# Patient Record
Sex: FEMALE | Race: ASIAN | Employment: UNEMPLOYED | URBAN - METROPOLITAN AREA
[De-identification: names, ages, dates, MRNs, and addresses within clinical notes are randomized per-mention and may not be internally consistent; named-entity substitution may affect disease eponyms.]

---

## 2017-11-02 PROBLEM — H90.3 SENSORINEURAL HEARING LOSS (SNHL), BILATERAL: Status: ACTIVE | Noted: 2017-11-02

## 2018-04-05 ENCOUNTER — OFFICE VISIT (OUTPATIENT)
Dept: INTERNAL MEDICINE CLINIC | Facility: CLINIC | Age: 48
End: 2018-04-05

## 2018-04-05 VITALS
HEIGHT: 59 IN | OXYGEN SATURATION: 98 % | BODY MASS INDEX: 21.47 KG/M2 | RESPIRATION RATE: 18 BRPM | WEIGHT: 106.5 LBS | TEMPERATURE: 99 F | HEART RATE: 83 BPM | DIASTOLIC BLOOD PRESSURE: 70 MMHG | SYSTOLIC BLOOD PRESSURE: 110 MMHG

## 2018-04-05 DIAGNOSIS — Z00.00 LABORATORY EXAM ORDERED AS PART OF ROUTINE GENERAL MEDICAL EXAMINATION: ICD-10-CM

## 2018-04-05 DIAGNOSIS — Z00.00 WELLNESS EXAMINATION: Primary | ICD-10-CM

## 2018-04-05 DIAGNOSIS — E04.1 THYROID NODULE: ICD-10-CM

## 2018-04-05 DIAGNOSIS — H53.9 CHANGES IN VISION: ICD-10-CM

## 2018-04-05 PROCEDURE — 99386 PREV VISIT NEW AGE 40-64: CPT | Performed by: INTERNAL MEDICINE

## 2018-04-05 NOTE — PROGRESS NOTES
[facility] Internal Medicine Office Note  Chief Complaint:   Patient presents with:  Physical: Est Pt.  Complete physical      HPI:   This is a 51year old female coming in for physical and establishing care  HPI  She would like to do blood work f omega-3 fatty acids 1000 MG Oral Cap Take 1,000 mg by mouth daily.  Disp:  Rfl:    Betamethasone Dipropionate Aug 0.05 % External Cream Apply to external ear bid with q-tip for 2 weeks , repeat as necessary Disp: 1 Tube Rfl: 5   Multiple Vitamins-Minerals vision      The plan is as follows  Israel Quiroz was seen today for physical.    Diagnoses and all orders for this visit:    Wellness examination - up to date with pap. mammo ordered.      Thyroid nodule - due for thyroid US; order placed  -     US THYROID (CPT=76

## 2018-04-06 ENCOUNTER — LAB ENCOUNTER (OUTPATIENT)
Dept: LAB | Age: 48
End: 2018-04-06
Attending: INTERNAL MEDICINE
Payer: COMMERCIAL

## 2018-04-06 DIAGNOSIS — Z00.00 LABORATORY EXAM ORDERED AS PART OF ROUTINE GENERAL MEDICAL EXAMINATION: ICD-10-CM

## 2018-04-06 PROCEDURE — 85025 COMPLETE CBC W/AUTO DIFF WBC: CPT

## 2018-04-06 PROCEDURE — 80061 LIPID PANEL: CPT

## 2018-04-06 PROCEDURE — 80050 GENERAL HEALTH PANEL: CPT

## 2018-04-06 PROCEDURE — 36415 COLL VENOUS BLD VENIPUNCTURE: CPT

## 2018-04-06 PROCEDURE — 84439 ASSAY OF FREE THYROXINE: CPT

## 2018-04-06 PROCEDURE — 80053 COMPREHEN METABOLIC PANEL: CPT

## 2018-04-25 ENCOUNTER — HOSPITAL ENCOUNTER (OUTPATIENT)
Dept: MAMMOGRAPHY | Age: 48
Discharge: HOME | End: 2018-04-25
Attending: OBSTETRICS & GYNECOLOGY
Payer: COMMERCIAL

## 2018-04-25 ENCOUNTER — HOSPITAL ENCOUNTER (OUTPATIENT)
Dept: ULTRASOUND IMAGING | Age: 48
Discharge: HOME | End: 2018-04-25
Attending: INTERNAL MEDICINE
Payer: COMMERCIAL

## 2018-04-25 ENCOUNTER — HOSPITAL ENCOUNTER (OUTPATIENT)
Dept: ULTRASOUND IMAGING | Age: 48
Discharge: HOME | End: 2018-04-25
Attending: OBSTETRICS & GYNECOLOGY
Payer: COMMERCIAL

## 2018-04-25 DIAGNOSIS — E04.1 THYROID NODULE: ICD-10-CM

## 2018-04-25 DIAGNOSIS — D25.1 INTRAMURAL LEIOMYOMA OF UTERUS: ICD-10-CM

## 2018-04-25 DIAGNOSIS — Z12.31 ENCOUNTER FOR SCREENING MAMMOGRAM FOR MALIGNANT NEOPLASM OF BREAST: ICD-10-CM

## 2018-04-25 PROCEDURE — 77067 SCR MAMMO BI INCL CAD: CPT | Performed by: OBSTETRICS & GYNECOLOGY

## 2018-04-25 PROCEDURE — 77063 BREAST TOMOSYNTHESIS BI: CPT | Performed by: OBSTETRICS & GYNECOLOGY

## 2018-04-25 PROCEDURE — 76856 US EXAM PELVIC COMPLETE: CPT | Performed by: OBSTETRICS & GYNECOLOGY

## 2018-04-25 PROCEDURE — 76536 US EXAM OF HEAD AND NECK: CPT | Performed by: INTERNAL MEDICINE

## 2018-04-25 PROCEDURE — 76830 TRANSVAGINAL US NON-OB: CPT | Performed by: OBSTETRICS & GYNECOLOGY

## 2018-04-26 NOTE — PROGRESS NOTES
Patient notified and verbalized understanding. Will follow up in the office with any prolonged heavy bleeding or pain. No complaints currently.

## 2018-04-26 NOTE — PROGRESS NOTES
Please inform pt that fibroid is 4 cm (previously 3.6 cm in 2016). Follow up in office with heavy, prolonged menses or pain.

## 2018-05-03 ENCOUNTER — MED REC SCAN ONLY (OUTPATIENT)
Dept: INTERNAL MEDICINE CLINIC | Facility: CLINIC | Age: 48
End: 2018-05-03

## 2019-04-23 ENCOUNTER — LAB ENCOUNTER (OUTPATIENT)
Dept: LAB | Age: 49
End: 2019-04-23
Attending: INTERNAL MEDICINE
Payer: COMMERCIAL

## 2019-04-23 ENCOUNTER — OFFICE VISIT (OUTPATIENT)
Dept: INTERNAL MEDICINE CLINIC | Facility: CLINIC | Age: 49
End: 2019-04-23
Payer: COMMERCIAL

## 2019-04-23 VITALS
HEIGHT: 59 IN | DIASTOLIC BLOOD PRESSURE: 60 MMHG | RESPIRATION RATE: 16 BRPM | WEIGHT: 106.75 LBS | BODY MASS INDEX: 21.52 KG/M2 | HEART RATE: 62 BPM | SYSTOLIC BLOOD PRESSURE: 118 MMHG | OXYGEN SATURATION: 98 % | TEMPERATURE: 98 F

## 2019-04-23 DIAGNOSIS — Z12.39 SCREENING FOR BREAST CANCER: ICD-10-CM

## 2019-04-23 DIAGNOSIS — E04.9 GOITER: ICD-10-CM

## 2019-04-23 DIAGNOSIS — Z00.00 ENCOUNTER FOR ROUTINE ADULT MEDICAL EXAMINATION: Primary | ICD-10-CM

## 2019-04-23 DIAGNOSIS — Z12.11 SCREEN FOR COLON CANCER: ICD-10-CM

## 2019-04-23 DIAGNOSIS — E04.1 THYROID NODULE: ICD-10-CM

## 2019-04-23 DIAGNOSIS — E55.9 HYPOVITAMINOSIS D: ICD-10-CM

## 2019-04-23 DIAGNOSIS — Z00.00 LABORATORY EXAM ORDERED AS PART OF ROUTINE GENERAL MEDICAL EXAMINATION: ICD-10-CM

## 2019-04-23 DIAGNOSIS — R07.89 NON-CARDIAC CHEST PAIN: ICD-10-CM

## 2019-04-23 DIAGNOSIS — Z12.4 SCREENING FOR CERVICAL CANCER: ICD-10-CM

## 2019-04-23 PROCEDURE — 80061 LIPID PANEL: CPT | Performed by: INTERNAL MEDICINE

## 2019-04-23 PROCEDURE — 82306 VITAMIN D 25 HYDROXY: CPT | Performed by: INTERNAL MEDICINE

## 2019-04-23 PROCEDURE — 99396 PREV VISIT EST AGE 40-64: CPT | Performed by: INTERNAL MEDICINE

## 2019-04-23 PROCEDURE — 87624 HPV HI-RISK TYP POOLED RSLT: CPT | Performed by: INTERNAL MEDICINE

## 2019-04-23 PROCEDURE — 36415 COLL VENOUS BLD VENIPUNCTURE: CPT | Performed by: INTERNAL MEDICINE

## 2019-04-23 PROCEDURE — 80050 GENERAL HEALTH PANEL: CPT | Performed by: INTERNAL MEDICINE

## 2019-04-23 PROCEDURE — 84439 ASSAY OF FREE THYROXINE: CPT | Performed by: INTERNAL MEDICINE

## 2019-04-23 NOTE — PROGRESS NOTES
[facility] Internal Medicine Office Note  Chief Complaint:   Patient presents with:  CPX      HPI:   This is a 47year old female coming in for physical  HPI   Due for blood work  Due for pap smear  Due for colon cancer screening    H/o thyroid n Neurological: Negative. Hematological: Negative. Psychiatric/Behavioral: Negative.          EXAM:   /60   Pulse 62   Temp 98 °F (36.7 °C) (Oral)   Resp 16   Ht 59\"   Wt 106 lb 12 oz   SpO2 98%   BMI 21.56 kg/m²  Estimated body mass index is 2 B    Screen for colon cancer -discussed colonoscopy at length. She is not sure if she will proceed. Gave kit for FIT testing  -     OCCULT BLOOD, FECAL, IMMUNOASSAY;  Future  -     GASTRO - INTERNAL    Screening for breast cancer  -     [facility]ILLA 2D+3D RANULFO today.      Brian Hinojosa MD

## 2019-04-23 NOTE — PATIENT INSTRUCTIONS
Blood work    Trial of Tums to see how you respond with chest discomfort.  Follow up if symptoms persist.      Call to schedule mammogram    Stool FIT testing  Consider colonoscopy in the future

## 2019-04-26 ENCOUNTER — HOSPITAL ENCOUNTER (OUTPATIENT)
Dept: MAMMOGRAPHY | Age: 49
Discharge: HOME | End: 2019-04-26
Attending: INTERNAL MEDICINE
Payer: COMMERCIAL

## 2019-04-26 DIAGNOSIS — Z12.39 SCREENING FOR BREAST CANCER: ICD-10-CM

## 2019-04-26 PROCEDURE — 77067 SCR MAMMO BI INCL CAD: CPT | Performed by: INTERNAL MEDICINE

## 2019-04-26 PROCEDURE — 77063 BREAST TOMOSYNTHESIS BI: CPT | Performed by: INTERNAL MEDICINE

## 2019-05-02 ENCOUNTER — TELEPHONE (OUTPATIENT)
Dept: INTERNAL MEDICINE CLINIC | Facility: CLINIC | Age: 49
End: 2019-05-02

## 2019-05-02 PROCEDURE — 82274 ASSAY TEST FOR BLOOD FECAL: CPT | Performed by: INTERNAL MEDICINE

## 2019-05-02 NOTE — TELEPHONE ENCOUNTER
Pt is concerned about completing the FIT test appropriately d/t not having BM every day. Per SM, as long as consecutive BM's and to [facility] in 10 days. Notified pt and she verbalized understanding.  Also advised pt that the alternative would be

## 2019-07-26 ENCOUNTER — HOSPITAL ENCOUNTER (OUTPATIENT)
Dept: ULTRASOUND IMAGING | Age: 49
Discharge: HOME | End: 2019-07-26
Attending: INTERNAL MEDICINE
Payer: COMMERCIAL

## 2019-07-26 DIAGNOSIS — E04.1 THYROID NODULE: ICD-10-CM

## 2019-07-26 PROCEDURE — 76536 US EXAM OF HEAD AND NECK: CPT | Performed by: INTERNAL MEDICINE

## 2019-08-23 ENCOUNTER — APPOINTMENT (OUTPATIENT)
Dept: LAB | Age: 49
End: 2019-08-23
Attending: OTOLARYNGOLOGY
Payer: COMMERCIAL

## 2019-08-23 DIAGNOSIS — E04.1 NONTOXIC THYROID NODULE: ICD-10-CM

## 2019-08-23 PROCEDURE — 86376 MICROSOMAL ANTIBODY EACH: CPT

## 2019-08-23 PROCEDURE — 86800 THYROGLOBULIN ANTIBODY: CPT

## 2019-08-24 LAB
THYROGLOBULIN AB: <0.9 IU/ML
THYROID PEROXIDASE (TPO) ANTIBODY: 4.7 IU/ML

## 2019-09-05 ENCOUNTER — HOSPITAL ENCOUNTER (OUTPATIENT)
Dept: ULTRASOUND IMAGING | Facility: HOSPITAL | Age: 49
Discharge: HOME | End: 2019-09-05
Attending: OTOLARYNGOLOGY
Payer: COMMERCIAL

## 2019-09-05 DIAGNOSIS — E04.1 NONTOXIC THYROID NODULE: ICD-10-CM

## 2019-09-05 PROCEDURE — 88173 CYTOPATH EVAL FNA REPORT: CPT | Performed by: OTOLARYNGOLOGY

## 2019-09-05 PROCEDURE — 10005 FNA BX W/US GDN 1ST LES: CPT | Performed by: OTOLARYNGOLOGY

## 2019-09-05 NOTE — PROGRESS NOTES
PROCEDURE: US FNA THYROID, GUIDED INCLD, FIRST LESION (CPT=10005)    COMPARISON: US JIMY THYROID (ZHR=[id number]), 7/26/2019, 10:22.     INDICATIONS: E04.1 Nontoxic single thyroid nodule    DESCRIPTION: The risks, benefits, and alternatives of the proced

## 2019-09-17 NOTE — PROGRESS NOTES
I am sorry she has these concerns. I do not make any decisions based on cost. The standard of care is to repeat the biopsies with Affirma and that is what I am recommending. She can seek a second opinion if desired.  She can choose to wait a year but will n

## 2019-09-18 NOTE — PROGRESS NOTES
Per SK: ok to have FNA with Afirma with partner in office. Informed pt, verb understanding. Pt stated she still needs to contact insurance and will call office once she decides how she would like to proceed.

## 2019-10-22 PROBLEM — Z12.11 SPECIAL SCREENING FOR MALIGNANT NEOPLASM OF COLON: Status: ACTIVE | Noted: 2019-10-22

## 2021-09-16 ENCOUNTER — TELEPHONE (OUTPATIENT)
Dept: INTERNAL MEDICINE CLINIC | Facility: CLINIC | Age: 51
End: 2021-09-16

## 2021-09-16 NOTE — TELEPHONE ENCOUNTER
Pt c/o burning sensation/tingling to lips and tongue. Pt reports that she began using a new toothpaste around the same time that her sx started. Denies redness, rash, or sores to the area. No c/o SOB, chest pain, headaches, vision changes.      Patient

## 2023-09-13 ENCOUNTER — OFFICE VISIT (OUTPATIENT)
Dept: INTERNAL MEDICINE CLINIC | Facility: CLINIC | Age: 53
End: 2023-09-13
Payer: COMMERCIAL

## 2023-09-13 ENCOUNTER — LAB ENCOUNTER (OUTPATIENT)
Dept: LAB | Age: 53
End: 2023-09-13
Attending: INTERNAL MEDICINE
Payer: COMMERCIAL

## 2023-09-13 VITALS
TEMPERATURE: 97 F | RESPIRATION RATE: 16 BRPM | OXYGEN SATURATION: 99 % | HEART RATE: 72 BPM | DIASTOLIC BLOOD PRESSURE: 62 MMHG | SYSTOLIC BLOOD PRESSURE: 110 MMHG | HEIGHT: 59 IN | WEIGHT: 117.19 LBS | BODY MASS INDEX: 23.63 KG/M2

## 2023-09-13 DIAGNOSIS — Z00.00 ENCOUNTER FOR ROUTINE ADULT MEDICAL EXAMINATION: Primary | ICD-10-CM

## 2023-09-13 DIAGNOSIS — N64.4 BREAST PAIN, LEFT: ICD-10-CM

## 2023-09-13 DIAGNOSIS — Z23 NEED FOR TDAP VACCINATION: ICD-10-CM

## 2023-09-13 DIAGNOSIS — E55.9 HYPOVITAMINOSIS D: ICD-10-CM

## 2023-09-13 DIAGNOSIS — Z00.00 LABORATORY EXAM ORDERED AS PART OF ROUTINE GENERAL MEDICAL EXAMINATION: ICD-10-CM

## 2023-09-13 DIAGNOSIS — M25.562 CHRONIC PAIN OF LEFT KNEE: ICD-10-CM

## 2023-09-13 DIAGNOSIS — Z12.31 SCREENING MAMMOGRAM FOR BREAST CANCER: ICD-10-CM

## 2023-09-13 DIAGNOSIS — G89.29 CHRONIC PAIN OF LEFT KNEE: ICD-10-CM

## 2023-09-13 LAB
ALBUMIN SERPL-MCNC: 4.1 G/DL (ref 3.4–5)
ALBUMIN/GLOB SERPL: 1.4 {RATIO} (ref 1–2)
ALP LIVER SERPL-CCNC: 62 U/L
ALT SERPL-CCNC: 35 U/L
ANION GAP SERPL CALC-SCNC: 3 MMOL/L (ref 0–18)
AST SERPL-CCNC: 21 U/L (ref 15–37)
BASOPHILS # BLD AUTO: 0.04 X10(3) UL (ref 0–0.2)
BASOPHILS NFR BLD AUTO: 0.6 %
BILIRUB SERPL-MCNC: 0.6 MG/DL (ref 0.1–2)
BUN BLD-MCNC: 15 MG/DL (ref 7–18)
CALCIUM BLD-MCNC: 9.1 MG/DL (ref 8.5–10.1)
CHLORIDE SERPL-SCNC: 109 MMOL/L (ref 98–112)
CHOLEST SERPL-MCNC: 174 MG/DL (ref ?–200)
CO2 SERPL-SCNC: 29 MMOL/L (ref 21–32)
CREAT BLD-MCNC: 0.78 MG/DL
EGFRCR SERPLBLD CKD-EPI 2021: 90 ML/MIN/1.73M2 (ref 60–?)
EOSINOPHIL # BLD AUTO: 0.05 X10(3) UL (ref 0–0.7)
EOSINOPHIL NFR BLD AUTO: 0.8 %
ERYTHROCYTE [DISTWIDTH] IN BLOOD BY AUTOMATED COUNT: 12.7 %
FASTING PATIENT LIPID ANSWER: YES
FASTING STATUS PATIENT QL REPORTED: YES
GLOBULIN PLAS-MCNC: 3 G/DL (ref 2.8–4.4)
GLUCOSE BLD-MCNC: 94 MG/DL (ref 70–99)
HCT VFR BLD AUTO: 39.2 %
HDLC SERPL-MCNC: 59 MG/DL (ref 40–59)
HGB BLD-MCNC: 13 G/DL
IMM GRANULOCYTES # BLD AUTO: 0.01 X10(3) UL (ref 0–1)
IMM GRANULOCYTES NFR BLD: 0.2 %
LDLC SERPL CALC-MCNC: 100 MG/DL (ref ?–100)
LYMPHOCYTES # BLD AUTO: 1.42 X10(3) UL (ref 1–4)
LYMPHOCYTES NFR BLD AUTO: 21.6 %
MCH RBC QN AUTO: 30.3 PG (ref 26–34)
MCHC RBC AUTO-ENTMCNC: 33.2 G/DL (ref 31–37)
MCV RBC AUTO: 91.4 FL
MONOCYTES # BLD AUTO: 0.37 X10(3) UL (ref 0.1–1)
MONOCYTES NFR BLD AUTO: 5.6 %
NEUTROPHILS # BLD AUTO: 4.69 X10 (3) UL (ref 1.5–7.7)
NEUTROPHILS # BLD AUTO: 4.69 X10(3) UL (ref 1.5–7.7)
NEUTROPHILS NFR BLD AUTO: 71.2 %
NONHDLC SERPL-MCNC: 115 MG/DL (ref ?–130)
OSMOLALITY SERPL CALC.SUM OF ELEC: 293 MOSM/KG (ref 275–295)
PLATELET # BLD AUTO: 247 10(3)UL (ref 150–450)
POTASSIUM SERPL-SCNC: 3.9 MMOL/L (ref 3.5–5.1)
PROT SERPL-MCNC: 7.1 G/DL (ref 6.4–8.2)
RBC # BLD AUTO: 4.29 X10(6)UL
SODIUM SERPL-SCNC: 141 MMOL/L (ref 136–145)
TRIGL SERPL-MCNC: 80 MG/DL (ref 30–149)
TSI SER-ACNC: 2.08 MIU/ML (ref 0.36–3.74)
VIT D+METAB SERPL-MCNC: 39.4 NG/ML (ref 30–100)
VLDLC SERPL CALC-MCNC: 13 MG/DL (ref 0–30)
WBC # BLD AUTO: 6.6 X10(3) UL (ref 4–11)

## 2023-09-13 PROCEDURE — 80061 LIPID PANEL: CPT

## 2023-09-13 PROCEDURE — 90715 TDAP VACCINE 7 YRS/> IM: CPT | Performed by: INTERNAL MEDICINE

## 2023-09-13 PROCEDURE — 3078F DIAST BP <80 MM HG: CPT | Performed by: INTERNAL MEDICINE

## 2023-09-13 PROCEDURE — 36415 COLL VENOUS BLD VENIPUNCTURE: CPT

## 2023-09-13 PROCEDURE — 84443 ASSAY THYROID STIM HORMONE: CPT

## 2023-09-13 PROCEDURE — 80053 COMPREHEN METABOLIC PANEL: CPT

## 2023-09-13 PROCEDURE — 85025 COMPLETE CBC W/AUTO DIFF WBC: CPT

## 2023-09-13 PROCEDURE — 99396 PREV VISIT EST AGE 40-64: CPT | Performed by: INTERNAL MEDICINE

## 2023-09-13 PROCEDURE — 3008F BODY MASS INDEX DOCD: CPT | Performed by: INTERNAL MEDICINE

## 2023-09-13 PROCEDURE — 3074F SYST BP LT 130 MM HG: CPT | Performed by: INTERNAL MEDICINE

## 2023-09-13 PROCEDURE — 90471 IMMUNIZATION ADMIN: CPT | Performed by: INTERNAL MEDICINE

## 2023-09-13 PROCEDURE — 82306 VITAMIN D 25 HYDROXY: CPT

## 2023-09-26 ENCOUNTER — OFFICE VISIT (OUTPATIENT)
Dept: OBGYN CLINIC | Facility: CLINIC | Age: 53
End: 2023-09-26

## 2023-09-26 VITALS — WEIGHT: 116.88 LBS | BODY MASS INDEX: 23.56 KG/M2 | HEIGHT: 59 IN

## 2023-09-26 DIAGNOSIS — Z12.31 SCREENING MAMMOGRAM FOR BREAST CANCER: ICD-10-CM

## 2023-09-26 DIAGNOSIS — Z01.419 ENCOUNTER FOR WELL WOMAN EXAM WITH ROUTINE GYNECOLOGICAL EXAM: Primary | ICD-10-CM

## 2023-09-26 PROCEDURE — 88175 CYTOPATH C/V AUTO FLUID REDO: CPT | Performed by: OBSTETRICS & GYNECOLOGY

## 2023-09-26 PROCEDURE — 87624 HPV HI-RISK TYP POOLED RSLT: CPT | Performed by: OBSTETRICS & GYNECOLOGY

## 2023-09-26 NOTE — PROGRESS NOTES
[facility], [facility]  Obstetrics and Gynecology  Gynecology Visit    Patient presents with: Annual          Henry Duffy is a 46year old female who presents for Annual exam.    LMP: Menopause    Menses regular: n/a. Menstrual flow normal: n/a. Birth control: no.   Refill no  Last Pap Smear: 2019. Any history of abnormal paps: No   Last MMG: has apt 23. Sleep: 8 hrs. Diet: Fair. Exercise: No.   Screening labs/Blood work today: no.     Colonoscopy (if over 40 y/o): 5 yrs ago. Gardasil:(age 10-37 y/o) n/a. Genetic Cancer screen (if indicated): No.   Flu (Aug-April): received at [facility]. TDAP (every 10 years) 2023. Additional Problems/concerns: Patient reports pain in left breast/ armpit shoulder area has diagnostic mammogram scheduled    Next Appt: Scheduled    Immunization History   Administered Date(s) Administered    Covid-19 Vaccine Pfizer 30 mcg/0.3 ml 2021, 2021, 2022    Covid-19 Vaccine Pfizer Bivalent 30mcg/0.3mL 10/03/2022    Covid-19 Vaccine Pfizer Ruben-Sucrose 30 mcg/0.3 ml 2022    FLUZONE 6 months and older PFS 0.5 ml (69022) 10/07/2020    TDAP 2011, 2023    Zoster Vaccine Recombinant Adjuvanted (Shingrix) 2023       No current outpatient medications on file.     No Known Allergies    OB History     T2    L2    SAB0  IAB0  Ectopic0  Multiple0  Live Births2     Name of Baby 1: Not recorded    Date: 99         GA: Not recorded     Delivery: Vaginal, Spontaneous    Apgar1: Not recorded     Billy Dicker: Not recorded    Living: Living    Name of Baby 2: Not recorded    Date: 10/25/02         GA: Not recorded     Delivery: Vaginal, Spontaneous    Apgar1: Not recorded     Billy Dicker: Not recorded    Living: Living      Past Medical History:   Diagnosis Date    Benign neoplasm of thyroid glands     follicular    Carpal tunnel syndrome     right    Degeneration of lumbar or lumbosacral intervertebral disc     Disturbance of skin sensation     Eye disease year 2017    Cataracts (treated in sep 2018)    Generalized hyperhidrosis     Hearing loss year 2017    mild hearing loss    Lumbago     Myalgia and myositis, unspecified     Need for prophylactic vaccination with combined diphtheria-tetanus-pertussis (DTP) vaccine     Nontoxic uninodular goiter     left    Primary focal hyperhidrosis     Sicca syndrome ([facility] [address]     Thoracic or lumbosacral neuritis or radiculitis, unspecified     Wears glasses year 1979       Past Surgical History:   Procedure Laterality Date    THYROIDECTOMY      partial, R lobe       Family History   Problem Relation Age of Onset    Other (liver disease) Paternal Grandfather     Diabetes Maternal Grandmother         Diabetes    Lipids Mother         hyperlipidemia    Diabetes Mother         Prediabetes    Cancer Paternal Uncle         liver ca    Lipids Father     Prostate Cancer Paternal Uncle         Tobacco  Allergies  Meds  Med Hx  Surg Hx  Fam Hx  Soc Hx        Social History    Socioeconomic History      Marital status:       Spouse name: Not on file      Number of children: Not on file      Years of education: Not on file      Highest education level: Not on file    Occupational History      Not on file    Tobacco Use      Smoking status: Never      Smokeless tobacco: Never    Substance and Sexual Activity      Alcohol use: No      Drug use: No      Sexual activity: Yes        Partners: Male    Other Topics      Concerns:         Service: Not Asked        Blood Transfusions: Not Asked        Caffeine Concern: Yes          1-2 cups coffee        Occupational Exposure: Not Asked        Hobby Hazards: Not Asked        Sleep Concern: Not Asked        Stress Concern: Not Asked        Weight Concern: Not Asked        Special Diet: Not Asked        Back Care: Not Asked        Exercise: No        Bike Helmet: Not Asked        Seat Belt: Not Asked        Self-Exams: Not Asked    Social History Narrative Not on file    Social Determinants of Health  Financial Resource Strain: Not on file  Food Insecurity: Not on file  Transportation Needs: Not on file  Physical Activity: Not on file  Stress: Not on file  Social Connections: Not on file  Housing Stability: Not on file      Ht 4' 11\" (1.499 m)   Wt 116 lb 13.5 oz (53 kg)   Wt Readings from Last 3 Encounters:  09/26/23 : 116 lb 13.5 oz (53 kg)  09/13/23 : 117 lb 3.2 oz (53.2 kg)  06/22/21 : 117 lb (53.1 kg)    Mammogram due on 04/26/2020  Pap Smear due on 04/23/2022  Zoster Vaccines(2 of 2) due on 06/13/2023  Influenza Vaccine(1) due on 10/01/2023  Annual Physical due on 09/13/2024  Colorectal Cancer Screening due on 10/22/2029  DTaP,Tdap,and Td Vaccines(3 - Td or Tdap) due on 09/13/2033  Annual Depression Screening Completed  COVID-19 Vaccine Completed  Pneumococcal Vaccine: Birth to 64yrs Aged Out        Review of Systems   General: Present- Feeling well. Not Present- Chills, Fever, Weight Gain and Weight Loss. HEENT: Not Present- Headache and Sore Throat. Respiratory: Not Present- Cough, Difficulty Breathing, Hemoptysis and Sputum Production. Breast: Not Present- Breast Mass, Breast Pain and Nipple Discharge. Cardiovascular: Not Present- Chest Pain, Elevated Blood Pressure, Fainting / Blacking Out and Shortness of Breath. Gastrointestinal: Not Present- Bloody Stool, Change in Bowel Habits, Constipation, Diarrhea, Heartburn, Nausea, Bloating and Vomiting. Female Genitourinary: Not Present- Discharge, Dysuria, Frequency, Incontinence, Pelvic Pain, Urgency, Urinating at Night, Vaginal Bleeding, Vaginal Discharge, Vaginal dryness and Vaginal itching/burning. Musculoskeletal: Not Present- Leg Cramps and Swelling of Extremities. Neurological: Not Present- Dizziness and Headaches. Psychiatric: Not Present- Anxiety and Depression. Endocrine: Not Present- Appetite Changes, Hair Changes and Thyroid Problems.   Hematology: Not Present- Blood Clots, Easy Bruising and Excessive bleeding. All other systems negative       Physical Exam   The physical exam findings are as follows:       General   Mental Status - Alert. General Appearance - Cooperative. Orientation - Oriented X4. Build & Nutrition - Well nourished. Head and Neck  Thyroid   Gland Characteristics - right lobe removed and left size and consistency. Chest and Lung Exam   Inspection:   Chest Wall: - Normal.  Palpation: - Palpation normal.  Auscultation:   Breath sounds: - Normal.  Adventitious sounds: - No Adventitious sounds. Breast   Nipples: Characteristics - Bilateral - Normal. Discharge - Bilateral - None. Breast - Bilateral - Symmetric. Cardiovascular   Auscultation: Rhythm - Regular. Heart Sounds - Normal heart sounds. Murmurs & Other Heart Sounds: Auscultation of the heart reveals - No Murmurs. Abdomen   Inspection: Inspection of the abdomen reveals - No Hernias. Incisional scars - No incisional scars. Palpation/Percussion: Palpation and Percussion of the abdomen reveal - Non Tender and No Palpable abdominal masses. Liver: - Normal.      Female Genitourinary     External Genitalia   Perineum - Normal. Bartholin's Gland - Bilateral - Normal. Clitoris - Normal.  Introitus: Characteristics - No Cystocele, Enterocele or Rectocele. Discharge - None. Labia Majora: Lesions - Bilateral - None. Characteristics - Bilateral - Normal.  Urethra: Characteristics - Normal. Discharge - None. [place] Gland - Bilateral - Normal.  Vulva: Lesions - None. Speculum & Bimanual   Vagina:   Vaginal Wall: - Normal.  Vaginal Lesions - None. Vaginal Mucosa - Normal.  Cervix: Characteristics - No Motion tenderness. Discharge - None. Uterus: Characteristics - Normal. Position - Midposition. Adnexa: Characteristics - Bilateral - Normal. Masses - No Adnexal Masses. Wet [place]: Main patient complaints - None.      Rectal   Anorectal Exam: External - normal external exam.    Peripheral Vascular   Upper Extremity: Palpation: - Pulses bilaterally normal.  Lower Extremity: Inspection - Bilateral - Inspection Normal.  Palpation: Edema - Bilateral - No edema.     Neurologic   Mental Status: - Normal.    Lymphatic  General Lymphatics   Description - Normal .    1. Encounter for well woman exam with routine gynecological exam

## 2023-09-27 ENCOUNTER — HOSPITAL ENCOUNTER (OUTPATIENT)
Dept: MAMMOGRAPHY | Age: 53
Discharge: HOME | End: 2023-09-27
Attending: OBSTETRICS & GYNECOLOGY
Payer: COMMERCIAL

## 2023-09-27 DIAGNOSIS — Z12.31 SCREENING MAMMOGRAM FOR BREAST CANCER: ICD-10-CM

## 2023-09-27 PROCEDURE — 77063 BREAST TOMOSYNTHESIS BI: CPT | Performed by: OBSTETRICS & GYNECOLOGY

## 2023-09-27 PROCEDURE — 77067 SCR MAMMO BI INCL CAD: CPT | Performed by: OBSTETRICS & GYNECOLOGY

## 2023-09-28 DIAGNOSIS — R92.30 DENSE BREASTS: Primary | ICD-10-CM

## 2023-09-28 DIAGNOSIS — R92.2 DENSE BREASTS: Primary | ICD-10-CM

## 2023-10-02 LAB — HPV I/H RISK 1 DNA SPEC QL NAA+PROBE: NEGATIVE
